# Patient Record
Sex: MALE
[De-identification: names, ages, dates, MRNs, and addresses within clinical notes are randomized per-mention and may not be internally consistent; named-entity substitution may affect disease eponyms.]

---

## 2019-11-04 ENCOUNTER — APPOINTMENT (OUTPATIENT)
Dept: PULMONOLOGY | Facility: CLINIC | Age: 44
End: 2019-11-04
Payer: COMMERCIAL

## 2019-11-04 VITALS
HEIGHT: 67 IN | SYSTOLIC BLOOD PRESSURE: 156 MMHG | OXYGEN SATURATION: 98 % | HEART RATE: 79 BPM | DIASTOLIC BLOOD PRESSURE: 101 MMHG | TEMPERATURE: 98.1 F | BODY MASS INDEX: 33.59 KG/M2 | WEIGHT: 214 LBS

## 2019-11-04 DIAGNOSIS — G47.33 OBSTRUCTIVE SLEEP APNEA (ADULT) (PEDIATRIC): ICD-10-CM

## 2019-11-04 DIAGNOSIS — F17.200 NICOTINE DEPENDENCE, UNSPECIFIED, UNCOMPLICATED: ICD-10-CM

## 2019-11-04 DIAGNOSIS — R06.02 SHORTNESS OF BREATH: ICD-10-CM

## 2019-11-04 DIAGNOSIS — J45.40 MODERATE PERSISTENT ASTHMA, UNCOMPLICATED: ICD-10-CM

## 2019-11-04 DIAGNOSIS — I48.91 UNSPECIFIED ATRIAL FIBRILLATION: ICD-10-CM

## 2019-11-04 DIAGNOSIS — J45.909 UNSPECIFIED ASTHMA, UNCOMPLICATED: ICD-10-CM

## 2019-11-04 DIAGNOSIS — K21.9 GASTRO-ESOPHAGEAL REFLUX DISEASE W/OUT ESOPHAGITIS: ICD-10-CM

## 2019-11-04 PROBLEM — Z00.00 ENCOUNTER FOR PREVENTIVE HEALTH EXAMINATION: Status: ACTIVE | Noted: 2019-11-04

## 2019-11-04 PROCEDURE — 99204 OFFICE O/P NEW MOD 45 MIN: CPT

## 2019-11-04 RX ORDER — FLUTICASONE PROPIONATE AND SALMETEROL XINAFOATE 230; 21 UG/1; UG/1
230-21 AEROSOL, METERED RESPIRATORY (INHALATION)
Qty: 6 | Refills: 1 | Status: ACTIVE | COMMUNITY
Start: 2019-11-04 | End: 1900-01-01

## 2019-11-04 RX ORDER — FLUTICASONE PROPIONATE AND SALMETEROL XINAFOATE 230; 21 UG/1; UG/1
230-21 AEROSOL, METERED RESPIRATORY (INHALATION)
Qty: 2 | Refills: 1 | Status: ACTIVE | COMMUNITY
Start: 2019-11-04 | End: 1900-01-01

## 2019-11-04 RX ORDER — PREDNISONE 10 MG/1
10 TABLET ORAL
Qty: 21 | Refills: 0 | Status: ACTIVE | COMMUNITY
Start: 2019-11-04 | End: 1900-01-01

## 2019-11-04 RX ORDER — AZITHROMYCIN 250 MG/1
250 TABLET, FILM COATED ORAL
Qty: 1 | Refills: 0 | Status: ACTIVE | COMMUNITY
Start: 2019-11-04 | End: 1900-01-01

## 2019-11-04 NOTE — HISTORY OF PRESENT ILLNESS
[FreeTextEntry1] : PATIENT HERRERA ANDERSON  1975\par PT DESCRIPTION \par DATE OF INITIAL VISIT     2019 \par AGE AT INITIAL VISIT    44   male    \par REFERRING MD DR WILLOW BARRERA  [phone]  [phone]\par REFERRING MD FAX             \par CC  2019 wheezing Gets sob talking Feels quivering inside chest Tried benzonatate and albuterol both help x over a month \par ALLERGY nkda shellfish pet dander \par SMOKING    2019 Current smoker Started at age 23   ppd Wife smokes too  ppd \par HABITS 2019 No etoh abuse No drug abuse current or past\par     \par FAMILY  2019 Fathe  of cva age 47   Brother has arrhythmia  No first degree relative have asthma Aunt has asthma \par 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        \par PETS   2019 Guinea pig                     \par TRAVEL    2019 None 2019 Lived in [facility] x 3 y when he was 3 \par PMH/PSH       1) Sleep apnea 2) Asthma 3) GERD 4) A fib Is on BASA (2019) (Dr Abelino Heck) 5) Allergy shellfish 6) Scar in lung from pneumonia child 7) Hepatitis when he was young \par MEDICATIONS 2019 Cardizem basa omeprazole Albuterol hfa p Albuterol neb \par HOME O2 2019 No \par HOME BPAP 2019 cpap for bibi x 4 y \par FLU VACCINE  2019 No Didnt feel well after flu vaccine last yr   \par PNEUMONIA VACCINE 2019 No\par \par \par \par ACTIVE PROBLEMS  \par SMOKER \par SLEEP APNEA \par ASTHMA \par ALLERGY SHELLFISH \par A fib \par GERD \par \par

## 2019-11-04 NOTE — DISCUSSION/SUMMARY
[FreeTextEntry1] : PT DATA\par PULMONARY PROBLEM\par \par [id number]\par COUGH DYSPNEA WHEEZE \par [id number]\par \par BACKGROUND \par 11/4/2019 wheezing Gets sob talking Feels quivering inside chest Tried benzonatate and albuterol both help x over a month \par 11/4/2019 No fever \par 11/4/2019 Did not take abio \par 11/4/2019 Symbicort is not covered by his insurance \par \par \par \par \par XXXXXX\par SMOKER \par XXXXXXX\par 1/4/2019 Current smoker Started at age 23  1/4 ppd Wife smokes too 1/2 ppd \par

## 2019-11-04 NOTE — PHYSICAL EXAM
[General Appearance - Well Developed] : well developed [Normal Appearance] : normal appearance [Well Groomed] : well groomed [General Appearance - Well Nourished] : well nourished [No Deformities] : no deformities [General Appearance - In No Acute Distress] : no acute distress [Normal Conjunctiva] : the conjunctiva exhibited no abnormalities [Eyelids - No Xanthelasma] : the eyelids demonstrated no xanthelasmas [Normal Oropharynx] : normal oropharynx [Neck Appearance] : the appearance of the neck was normal [Neck Cervical Mass (___cm)] : no neck mass was observed [Jugular Venous Distention Increased] : there was no jugular-venous distention [Thyroid Diffuse Enlargement] : the thyroid was not enlarged [Thyroid Nodule] : there were no palpable thyroid nodules [Respiration, Rhythm And Depth] : normal respiratory rhythm and effort [Exaggerated Use Of Accessory Muscles For Inspiration] : no accessory muscle use [Auscultation Breath Sounds / Voice Sounds] : lungs were clear to auscultation bilaterally [Abdomen Soft] : soft [Abdomen Tenderness] : non-tender [Abdomen Mass (___ Cm)] : no abdominal mass palpated [Abnormal Walk] : normal gait [Gait - Sufficient For Exercise Testing] : the gait was sufficient for exercise testing [Nail Clubbing] : no clubbing of the fingernails [Cyanosis, Localized] : no localized cyanosis [Petechial Hemorrhages (___cm)] : no petechial hemorrhages [] : no ischemic changes [FreeTextEntry1] : has sniffles Has very mild resp distress Has cough

## 2019-11-18 ENCOUNTER — APPOINTMENT (OUTPATIENT)
Dept: PULMONOLOGY | Facility: CLINIC | Age: 44
End: 2019-11-18